# Patient Record
Sex: MALE | Race: WHITE | Employment: UNEMPLOYED | URBAN - METROPOLITAN AREA
[De-identification: names, ages, dates, MRNs, and addresses within clinical notes are randomized per-mention and may not be internally consistent; named-entity substitution may affect disease eponyms.]

---

## 2019-04-15 ENCOUNTER — HOSPITAL ENCOUNTER (EMERGENCY)
Age: 19
Discharge: HOME | End: 2019-04-15
Attending: EMERGENCY MEDICINE
Payer: COMMERCIAL

## 2019-04-15 ENCOUNTER — APPOINTMENT (OUTPATIENT)
Dept: GENERAL RADIOLOGY | Age: 19
End: 2019-04-15
Payer: COMMERCIAL

## 2019-04-15 VITALS
HEART RATE: 97 BPM | HEIGHT: 70 IN | RESPIRATION RATE: 18 BRPM | BODY MASS INDEX: 24.34 KG/M2 | WEIGHT: 170 LBS | OXYGEN SATURATION: 95 % | TEMPERATURE: 97.9 F | SYSTOLIC BLOOD PRESSURE: 145 MMHG | DIASTOLIC BLOOD PRESSURE: 89 MMHG

## 2019-04-15 DIAGNOSIS — F41.1 ANXIETY STATE: ICD-10-CM

## 2019-04-15 DIAGNOSIS — R94.31 PROLONGED Q-T INTERVAL ON ECG: ICD-10-CM

## 2019-04-15 DIAGNOSIS — R06.02 SHORTNESS OF BREATH: Primary | ICD-10-CM

## 2019-04-15 DIAGNOSIS — R03.0 ELEVATED BLOOD PRESSURE READING: ICD-10-CM

## 2019-04-15 LAB
A/G RATIO: 1.8 (ref 1.1–2.2)
ACETAMINOPHEN LEVEL: <5 UG/ML (ref 10–30)
ALBUMIN SERPL-MCNC: 5.1 G/DL (ref 3.4–5)
ALP BLD-CCNC: 59 U/L (ref 40–129)
ALT SERPL-CCNC: 22 U/L (ref 10–40)
AMPHETAMINE SCREEN, URINE: NORMAL
ANION GAP SERPL CALCULATED.3IONS-SCNC: 15 MMOL/L (ref 3–16)
AST SERPL-CCNC: 18 U/L (ref 15–37)
BARBITURATE SCREEN URINE: NORMAL
BENZODIAZEPINE SCREEN, URINE: NORMAL
BILIRUB SERPL-MCNC: 0.8 MG/DL (ref 0–1)
BILIRUBIN URINE: NEGATIVE
BLOOD, URINE: NEGATIVE
BUN BLDV-MCNC: 11 MG/DL (ref 7–20)
CALCIUM SERPL-MCNC: 9.4 MG/DL (ref 8.3–10.6)
CANNABINOID SCREEN URINE: NORMAL
CHLORIDE BLD-SCNC: 102 MMOL/L (ref 99–110)
CLARITY: CLEAR
CO2: 22 MMOL/L (ref 21–32)
COCAINE METABOLITE SCREEN URINE: NORMAL
COLOR: YELLOW
CREAT SERPL-MCNC: 0.9 MG/DL (ref 0.9–1.3)
ETHANOL: NORMAL MG/DL (ref 0–0.08)
GFR AFRICAN AMERICAN: >60
GFR NON-AFRICAN AMERICAN: >60
GLOBULIN: 2.8 G/DL
GLUCOSE BLD-MCNC: 122 MG/DL (ref 70–99)
GLUCOSE URINE: NEGATIVE MG/DL
HCT VFR BLD CALC: 45.2 % (ref 40.5–52.5)
HEMOGLOBIN: 15.6 G/DL (ref 13.5–17.5)
KETONES, URINE: ABNORMAL MG/DL
LEUKOCYTE ESTERASE, URINE: NEGATIVE
Lab: NORMAL
MAGNESIUM: 2.1 MG/DL (ref 1.8–2.4)
MCH RBC QN AUTO: 30.2 PG (ref 26–34)
MCHC RBC AUTO-ENTMCNC: 34.5 G/DL (ref 31–36)
MCV RBC AUTO: 87.5 FL (ref 80–100)
METHADONE SCREEN, URINE: NORMAL
MICROSCOPIC EXAMINATION: ABNORMAL
NITRITE, URINE: NEGATIVE
OPIATE SCREEN URINE: NORMAL
OXYCODONE URINE: NORMAL
PDW BLD-RTO: 12.8 % (ref 12.4–15.4)
PH UA: 8
PH UA: 8 (ref 5–8)
PHENCYCLIDINE SCREEN URINE: NORMAL
PLATELET # BLD: 228 K/UL (ref 135–450)
PMV BLD AUTO: 7.9 FL (ref 5–10.5)
POTASSIUM SERPL-SCNC: 3.5 MMOL/L (ref 3.5–5.1)
PROPOXYPHENE SCREEN: NORMAL
PROTEIN UA: NEGATIVE MG/DL
RBC # BLD: 5.16 M/UL (ref 4.2–5.9)
SALICYLATE, SERUM: <0.3 MG/DL (ref 15–30)
SODIUM BLD-SCNC: 139 MMOL/L (ref 136–145)
SPECIFIC GRAVITY UA: 1.01 (ref 1–1.03)
TOTAL PROTEIN: 7.9 G/DL (ref 6.4–8.2)
TROPONIN: <0.01 NG/ML
URINE TYPE: ABNORMAL
UROBILINOGEN, URINE: 0.2 E.U./DL
WBC # BLD: 10.7 K/UL (ref 4–11)

## 2019-04-15 PROCEDURE — 71046 X-RAY EXAM CHEST 2 VIEWS: CPT

## 2019-04-15 PROCEDURE — 93005 ELECTROCARDIOGRAM TRACING: CPT | Performed by: EMERGENCY MEDICINE

## 2019-04-15 PROCEDURE — G0480 DRUG TEST DEF 1-7 CLASSES: HCPCS

## 2019-04-15 PROCEDURE — 99285 EMERGENCY DEPT VISIT HI MDM: CPT

## 2019-04-15 PROCEDURE — 6370000000 HC RX 637 (ALT 250 FOR IP): Performed by: EMERGENCY MEDICINE

## 2019-04-15 PROCEDURE — 81003 URINALYSIS AUTO W/O SCOPE: CPT

## 2019-04-15 PROCEDURE — 36415 COLL VENOUS BLD VENIPUNCTURE: CPT

## 2019-04-15 PROCEDURE — 84484 ASSAY OF TROPONIN QUANT: CPT

## 2019-04-15 PROCEDURE — 83735 ASSAY OF MAGNESIUM: CPT

## 2019-04-15 PROCEDURE — 85027 COMPLETE CBC AUTOMATED: CPT

## 2019-04-15 PROCEDURE — 80307 DRUG TEST PRSMV CHEM ANLYZR: CPT

## 2019-04-15 PROCEDURE — 80053 COMPREHEN METABOLIC PANEL: CPT

## 2019-04-15 RX ORDER — LORAZEPAM 1 MG/1
1 TABLET ORAL ONCE
Status: COMPLETED | OUTPATIENT
Start: 2019-04-15 | End: 2019-04-15

## 2019-04-15 RX ORDER — LORATADINE 10 MG/1
10 CAPSULE, LIQUID FILLED ORAL DAILY
COMMUNITY

## 2019-04-15 RX ORDER — HYDROXYZINE PAMOATE 25 MG/1
25-50 CAPSULE ORAL 3 TIMES DAILY PRN
Qty: 30 CAPSULE | Refills: 0 | Status: SHIPPED | OUTPATIENT
Start: 2019-04-15 | End: 2019-04-25

## 2019-04-15 RX ADMIN — LORAZEPAM 1 MG: 1 TABLET ORAL at 12:45

## 2019-04-15 ASSESSMENT — ENCOUNTER SYMPTOMS
COLOR CHANGE: 0
ABDOMINAL PAIN: 0
BACK PAIN: 0
COUGH: 0
VOMITING: 0
WHEEZING: 0
APNEA: 0
NAUSEA: 0
CHEST TIGHTNESS: 0
STRIDOR: 0
DIARRHEA: 0
SHORTNESS OF BREATH: 1
CHOKING: 0

## 2019-04-15 NOTE — ED NOTES
Attempted to complete tele-health assessment. Pt put hat and barrera on over face and would not look at ipad. Pt had minimal verbal interaction and was difficult to understand as the few words spoken were muffled. Unable to complete tele-health due to lack of pt interaction. Carol Macias RN will locate father and step-mother and call this writer back with collateral contact info.      [address] [place] [address]  04/15/19 4447

## 2019-04-15 NOTE — ED PROVIDER NOTES
REVIEW OF SYSTEMS    (2-9 systems for level 4, 10 or more for level 5)     Review of Systems   Constitutional: Negative for chills, diaphoresis, fatigue and fever. HENT: Negative for congestion. Respiratory: Positive for shortness of breath. Negative for apnea, cough, choking, chest tightness, wheezing and stridor. Cardiovascular: Negative for chest pain, palpitations and leg swelling. Gastrointestinal: Negative for abdominal pain, diarrhea, nausea and vomiting. Genitourinary: Negative for flank pain. Musculoskeletal: Negative for back pain and neck pain. Skin: Negative for color change. Neurological: Negative for dizziness, syncope, weakness, light-headedness, numbness and headaches. Psychiatric/Behavioral: Positive for sleep disturbance. Negative for agitation, behavioral problems, confusion, decreased concentration, dysphoric mood, hallucinations, self-injury and suicidal ideas. The patient is nervous/anxious. The patient is not hyperactive. Positives and Pertinent negatives as per HPI. PASTMEDICAL HISTORY     Past Medical History:   Diagnosis Date    ADHD (attention deficit hyperactivity disorder)          SURGICAL HISTORY     History reviewed. No pertinent surgical history. CURRENT MEDICATIONS       Discharge Medication List as of 4/15/2019  3:45 PM      CONTINUE these medications which have NOT CHANGED    Details   loratadine (CLARITIN) 10 MG capsule Take 10 mg by mouth dailyHistorical Med             ALLERGIES     Patient has no known allergies. FAMILY HISTORY     History reviewed. No pertinent family history.        SOCIAL HISTORY       Social History     Socioeconomic History    Marital status: Single     Spouse name: None    Number of children: None    Years of education: None    Highest education level: None   Occupational History    None   Social Needs    Financial resource strain: None    Food insecurity:     Worry: None     Inability: None    Transportation needs:     Medical: None     Non-medical: None   Tobacco Use    Smoking status: Former Smoker    Smokeless tobacco: Never Used   Substance and Sexual Activity    Alcohol use: No    Drug use: Yes     Types: Marijuana     Comment: once in a while     Sexual activity: Never   Lifestyle    Physical activity:     Days per week: None     Minutes per session: None    Stress: None   Relationships    Social connections:     Talks on phone: None     Gets together: None     Attends [employer] service: None     Active member of club or organization: None     Attends meetings of clubs or organizations: None     Relationship status: None    Intimate partner violence:     Fear of current or ex partner: None     Emotionally abused: None     Physically abused: None     Forced sexual activity: None   Other Topics Concern    None   Social History Narrative    None       SCREENINGS    Diony Coma Scale  Eye Opening: Spontaneous  Best Verbal Response: Oriented  Best Motor Response: Obeys commands  Diony Coma Scale Score: 15 @FLOW(57321367)@      PHYSICAL EXAM    (up to 7 for level 4, 8 or more for level 5)     ED Triage Vitals [04/15/19 1147]   BP Temp Temp src Heart Rate Resp SpO2 Height Weight   (!) 150/80 -- -- 95 24 97 % 5' 10\" (1.778 m) 170 lb (77.1 kg)       Physical Exam   Constitutional: He is oriented to person, place, and time. He appears well-developed and well-nourished. He is active and cooperative. Non-toxic appearance. He does not have a sickly appearance. He does not appear ill. No distress. HENT:   Head: Normocephalic and atraumatic. Nose: Nose normal.   Mouth/Throat: Mucous membranes are normal. No oropharyngeal exudate. Eyes: Pupils are equal, round, and reactive to light. EOM are normal. Right eye exhibits no discharge. Left eye exhibits no discharge. No scleral icterus. Neck: Trachea normal, normal range of motion and full passive range of motion without pain. Neck supple.  No components:    Salicylate, Serum <8.2 (*)     All other components within normal limits    Narrative:     Performed at:  [address], [facility] [employer]   Phone [phone]   ACETAMINOPHEN LEVEL - Abnormal; Notable for the following components:    Acetaminophen Level <5 (*)     All other components within normal limits    Narrative:     Performed at:  [place] [address] [employer]   Phone [phone]   COMPREHENSIVE METABOLIC PANEL - Abnormal; Notable for the following components:    Glucose 122 (*)     Alb 5.1 (*)     All other components within normal limits    Narrative:     Performed at:  [place] [address] [employer]   Phone [phone]   URINE DRUG SCREEN    Narrative:     Performed at:  [place] [address] [employer]   Phone [phone]   ETHANOL    Narrative:     Performed at:  [place] [address] [employer]   Phone [phone]   TROPONIN    Narrative:     Performed at:  [address], [place] [employer]   Phone [phone]   CBC    Narrative:     Performed at:  [place] [address] [employer]   Phone [phone]   MAGNESIUM    Narrative:     Performed at:  [address] [place] [employer]   Phone [phone]       All other labs were within normal range or not returned asof this dictation. EKG:  All EKG's are interpreted by the Emergency Department Physician who either signs or Co-signs this chart in the absence of a cardiologist.    The Ekg interpreted by me shows  normal sinus rhythm with a rate of 82  Axis is   Right axis deviation  QTc is  borderline prolonged QT  Intervals and Durations are unremarkable. ST Segments: nonspecific changes  No significant change from prior EKG dated - no old EKG  No STEMI   Repeat EKG improved and no longer with prolonged QT         RADIOLOGY:   Non-plain film images such as CT, Ultrasound and MRI are read by the radiologist. Gladys Susannah images are visualized and preliminarily interpreted by the  ED Provider with the belowfindings:        Interpretation per the Radiologist below, if available at the time of this note:    XR CHEST STANDARD (2 VW)   Final Result   No active cardiopulmonary disease               PROCEDURES   Unless otherwise noted below, none     Procedures    CRITICAL CARE TIME   N/A    CONSULTS:  None    EMERGENCY DEPARTMENT COURSE and DIFFERENTIAL DIAGNOSIS/MDM:   Vitals:    Vitals:    04/15/19 1147 04/15/19 1420 04/15/19 1604   BP: (!) 150/80 (!) 150/101 (!) 145/89   Pulse: 95 77 97   Resp: 24 16 18   Temp: 97.9 °F (36.6 °C)     TempSrc: Oral     SpO2: 97% 98% 95%   Weight: 170 lb (77.1 kg)     Height: 5' 10\" (1.778 m)         Patient was given the following medications:  Medications   LORazepam (ATIVAN) tablet 1 mg (1 mg Oral Given 4/15/19 1245)     Patient was evaluated due to concern for shortness of breath for the last 3 weeks. PERC = 0 and story not suggestive of pulmonary embolism. EKG showed nonspecific changes but story not suggestive of acute coronary syndrome and troponin was negative. He denied any suicidal or homicidal ideation but did appear anxious in the room. I offered to have him speak with psychiatry via Tele-psych and he initially agreed. However whenever they tried to speak with him at that time he changed his mind and declined. When I reevaluated him, he was well-appearing in the room and actually was able to sleep for some time in the ED. I did discuss with his parents about having him follow up with primary doctor and possibly with cardiology.   I also recommended that he see his primary doctor to be started on medication for anxiety and depression. At this time, he has given me no reason to keep him on a 72 hour hold, and is only stating he feels anxious. His shortness of breath is most likely due to anxiety. He was given strict return precautions for any worsening anxiety, shortness of breath, development of chest pain, thoughts of wanting to harm himself or others, but otherwise follow up urgently with his primary doctor in regards to his symptoms. He was in no acute distress at time of discharge. The patient tolerated their visit well. The patient and / or the family were informed of the results of any tests, a time was given to answer questions. FINAL IMPRESSION      1. Shortness of breath    2. Anxiety state    3. Elevated blood pressure reading    4.  Prolonged Q-T interval on ECG          DISPOSITION/PLAN   DISPOSITION Decision To Discharge 04/15/2019 03:36:47 PM      PATIENT REFERRED TO:  [facility]  [address]  Go to   If symptoms worsen    Keiko Tracy MD  [address] [facility]  [phone]    Schedule an appointment as soon as possible for a visit in 2 days  For repeat evaluation    [place] [address] [facility]  [address] [place] [address]  [phone]    Schedule an appointment as soon as possible for a visit in 1 week  For repeat EKG due to concern for prolonged QT      DISCHARGEMEDICATIONS:  Discharge Medication List as of 4/15/2019  3:45 PM      START taking these medications    Details   hydrOXYzine (VISTARIL) 25 MG capsule Take 1-2 capsules by mouth 3 times daily as needed for Anxiety (do not drive with this), [id number] capsule, R-[id number]             DISCONTINUED MEDICATIONS:  Discharge Medication List as of 4/15/2019  3:45 PM      STOP taking these medications       ARIPiprazole (ABILIFY) 10 MG tablet Comments:   Reason for Stopping:                      (Please note that portions of this note were completed with a voicerecognition program.  Efforts were made to edit the dictations but occasionally words are mis-transcribed.)    Melyssa Paige MD (electronically signed)            Melyssa Paige MD  04/15/19 5740

## 2019-04-15 NOTE — ED NOTES
Writer spoke with pt's father, Robbie Burnette, for collateral information. Robbie Burnette states he and step-mother brought pt to hospital today for shortness of breath and difficulty breathing. Father states he has no concerns for pt's recent mental health. Father states he has no concern that pt would harm himself or another person. Father states he is unsure that pt would follow up with any resources given. Writer explained to father that if pt needs behavioral health tx in the future he can bring pt directly to [facility] ED for assessment. Father verbalized understanding.      [address] [place] [address]  04/15/19 3662

## 2019-04-16 LAB
EKG ATRIAL RATE: 82 BPM
EKG DIAGNOSIS: NORMAL
EKG P AXIS: 84 DEGREES
EKG P-R INTERVAL: 128 MS
EKG Q-T INTERVAL: 498 MS
EKG QRS DURATION: 96 MS
EKG QTC CALCULATION (BAZETT): 581 MS
EKG R AXIS: 95 DEGREES
EKG T AXIS: 50 DEGREES
EKG VENTRICULAR RATE: 82 BPM

## 2019-04-16 PROCEDURE — 93010 ELECTROCARDIOGRAM REPORT: CPT | Performed by: INTERNAL MEDICINE

## 2019-04-17 LAB
EKG ATRIAL RATE: 91 BPM
EKG DIAGNOSIS: NORMAL
EKG P AXIS: 76 DEGREES
EKG P-R INTERVAL: 130 MS
EKG Q-T INTERVAL: 334 MS
EKG QRS DURATION: 92 MS
EKG QTC CALCULATION (BAZETT): 410 MS
EKG R AXIS: 103 DEGREES
EKG T AXIS: 35 DEGREES
EKG VENTRICULAR RATE: 91 BPM

## 2019-04-17 PROCEDURE — 93010 ELECTROCARDIOGRAM REPORT: CPT | Performed by: INTERNAL MEDICINE
